# Patient Record
Sex: FEMALE | Race: WHITE
[De-identification: names, ages, dates, MRNs, and addresses within clinical notes are randomized per-mention and may not be internally consistent; named-entity substitution may affect disease eponyms.]

---

## 2019-03-11 ENCOUNTER — HOSPITAL ENCOUNTER (INPATIENT)
Dept: HOSPITAL 94 - ICU 2S | Age: 29
LOS: 6 days | Discharge: LEFT BEFORE DISCHARGE | DRG: 133 | End: 2019-03-17
Attending: INTERNAL MEDICINE | Admitting: INTERNAL MEDICINE
Payer: MEDICAID

## 2019-03-11 VITALS — BODY MASS INDEX: 19.17 KG/M2 | WEIGHT: 115.08 LBS | HEIGHT: 65 IN

## 2019-03-11 DIAGNOSIS — Z91.11: ICD-10-CM

## 2019-03-11 DIAGNOSIS — Z86.711: ICD-10-CM

## 2019-03-11 DIAGNOSIS — R21: ICD-10-CM

## 2019-03-11 DIAGNOSIS — J96.00: Primary | ICD-10-CM

## 2019-03-11 DIAGNOSIS — F32.9: ICD-10-CM

## 2019-03-11 DIAGNOSIS — F41.8: ICD-10-CM

## 2019-03-11 DIAGNOSIS — E83.42: ICD-10-CM

## 2019-03-11 DIAGNOSIS — J18.9: ICD-10-CM

## 2019-03-11 DIAGNOSIS — E87.5: ICD-10-CM

## 2019-03-11 DIAGNOSIS — Z88.8: ICD-10-CM

## 2019-03-11 DIAGNOSIS — F41.9: ICD-10-CM

## 2019-03-11 DIAGNOSIS — I50.9: ICD-10-CM

## 2019-03-11 DIAGNOSIS — I11.0: ICD-10-CM

## 2019-03-11 DIAGNOSIS — Z56.0: ICD-10-CM

## 2019-03-11 DIAGNOSIS — Z53.21: ICD-10-CM

## 2019-03-11 DIAGNOSIS — E87.6: ICD-10-CM

## 2019-03-11 DIAGNOSIS — E10.40: ICD-10-CM

## 2019-03-11 DIAGNOSIS — Z79.01: ICD-10-CM

## 2019-03-11 DIAGNOSIS — F17.200: ICD-10-CM

## 2019-03-11 PROCEDURE — 84132 ASSAY OF SERUM POTASSIUM: CPT

## 2019-03-11 PROCEDURE — 84484 ASSAY OF TROPONIN QUANT: CPT

## 2019-03-11 PROCEDURE — 83735 ASSAY OF MAGNESIUM: CPT

## 2019-03-11 PROCEDURE — 80202 ASSAY OF VANCOMYCIN: CPT

## 2019-03-11 PROCEDURE — 82948 REAGENT STRIP/BLOOD GLUCOSE: CPT

## 2019-03-11 PROCEDURE — 85379 FIBRIN DEGRADATION QUANT: CPT

## 2019-03-11 PROCEDURE — 85018 HEMOGLOBIN: CPT

## 2019-03-11 PROCEDURE — 94640 AIRWAY INHALATION TREATMENT: CPT

## 2019-03-11 PROCEDURE — 82803 BLOOD GASES ANY COMBINATION: CPT

## 2019-03-11 PROCEDURE — 85025 COMPLETE CBC W/AUTO DIFF WBC: CPT

## 2019-03-11 PROCEDURE — 94760 N-INVAS EAR/PLS OXIMETRY 1: CPT

## 2019-03-11 PROCEDURE — 86900 BLOOD TYPING SEROLOGIC ABO: CPT

## 2019-03-11 PROCEDURE — 86901 BLOOD TYPING SEROLOGIC RH(D): CPT

## 2019-03-11 PROCEDURE — 83036 HEMOGLOBIN GLYCOSYLATED A1C: CPT

## 2019-03-11 PROCEDURE — 83605 ASSAY OF LACTIC ACID: CPT

## 2019-03-11 PROCEDURE — 36600 WITHDRAWAL OF ARTERIAL BLOOD: CPT

## 2019-03-11 PROCEDURE — 84100 ASSAY OF PHOSPHORUS: CPT

## 2019-03-11 PROCEDURE — 87040 BLOOD CULTURE FOR BACTERIA: CPT

## 2019-03-11 PROCEDURE — 85610 PROTHROMBIN TIME: CPT

## 2019-03-11 PROCEDURE — 87070 CULTURE OTHR SPECIMN AEROBIC: CPT

## 2019-03-11 PROCEDURE — 93971 EXTREMITY STUDY: CPT

## 2019-03-11 PROCEDURE — 80053 COMPREHEN METABOLIC PANEL: CPT

## 2019-03-11 PROCEDURE — 93005 ELECTROCARDIOGRAM TRACING: CPT

## 2019-03-11 PROCEDURE — 83880 ASSAY OF NATRIURETIC PEPTIDE: CPT

## 2019-03-11 PROCEDURE — 86885 COOMBS TEST INDIRECT QUAL: CPT

## 2019-03-11 PROCEDURE — 85730 THROMBOPLASTIN TIME PARTIAL: CPT

## 2019-03-11 PROCEDURE — 93306 TTE W/DOPPLER COMPLETE: CPT

## 2019-03-11 PROCEDURE — 94660 CPAP INITIATION&MGMT: CPT

## 2019-03-11 PROCEDURE — 36415 COLL VENOUS BLD VENIPUNCTURE: CPT

## 2019-03-11 PROCEDURE — 71045 X-RAY EXAM CHEST 1 VIEW: CPT

## 2019-03-11 PROCEDURE — 84145 PROCALCITONIN (PCT): CPT

## 2019-03-11 SDOH — ECONOMIC STABILITY - INCOME SECURITY: UNEMPLOYMENT, UNSPECIFIED: Z56.0

## 2019-03-12 VITALS — SYSTOLIC BLOOD PRESSURE: 110 MMHG | DIASTOLIC BLOOD PRESSURE: 54 MMHG

## 2019-03-12 VITALS — DIASTOLIC BLOOD PRESSURE: 84 MMHG | SYSTOLIC BLOOD PRESSURE: 130 MMHG

## 2019-03-12 VITALS — DIASTOLIC BLOOD PRESSURE: 115 MMHG | SYSTOLIC BLOOD PRESSURE: 170 MMHG

## 2019-03-12 VITALS — SYSTOLIC BLOOD PRESSURE: 132 MMHG | DIASTOLIC BLOOD PRESSURE: 80 MMHG

## 2019-03-12 VITALS — SYSTOLIC BLOOD PRESSURE: 110 MMHG | DIASTOLIC BLOOD PRESSURE: 57 MMHG

## 2019-03-12 VITALS — SYSTOLIC BLOOD PRESSURE: 126 MMHG | DIASTOLIC BLOOD PRESSURE: 67 MMHG

## 2019-03-12 VITALS — DIASTOLIC BLOOD PRESSURE: 68 MMHG | SYSTOLIC BLOOD PRESSURE: 122 MMHG

## 2019-03-12 VITALS — DIASTOLIC BLOOD PRESSURE: 69 MMHG | SYSTOLIC BLOOD PRESSURE: 123 MMHG

## 2019-03-12 VITALS — SYSTOLIC BLOOD PRESSURE: 116 MMHG | DIASTOLIC BLOOD PRESSURE: 62 MMHG

## 2019-03-12 VITALS — SYSTOLIC BLOOD PRESSURE: 136 MMHG | DIASTOLIC BLOOD PRESSURE: 86 MMHG

## 2019-03-12 VITALS — DIASTOLIC BLOOD PRESSURE: 105 MMHG | SYSTOLIC BLOOD PRESSURE: 145 MMHG

## 2019-03-12 VITALS — DIASTOLIC BLOOD PRESSURE: 61 MMHG | SYSTOLIC BLOOD PRESSURE: 102 MMHG

## 2019-03-12 VITALS — DIASTOLIC BLOOD PRESSURE: 90 MMHG | SYSTOLIC BLOOD PRESSURE: 141 MMHG

## 2019-03-12 VITALS — SYSTOLIC BLOOD PRESSURE: 111 MMHG | DIASTOLIC BLOOD PRESSURE: 68 MMHG

## 2019-03-12 VITALS — DIASTOLIC BLOOD PRESSURE: 89 MMHG | SYSTOLIC BLOOD PRESSURE: 137 MMHG

## 2019-03-12 VITALS — SYSTOLIC BLOOD PRESSURE: 129 MMHG | DIASTOLIC BLOOD PRESSURE: 79 MMHG

## 2019-03-12 VITALS — DIASTOLIC BLOOD PRESSURE: 110 MMHG | SYSTOLIC BLOOD PRESSURE: 170 MMHG

## 2019-03-12 VITALS — DIASTOLIC BLOOD PRESSURE: 74 MMHG | SYSTOLIC BLOOD PRESSURE: 122 MMHG

## 2019-03-12 VITALS — DIASTOLIC BLOOD PRESSURE: 52 MMHG | SYSTOLIC BLOOD PRESSURE: 101 MMHG

## 2019-03-12 LAB
ALBUMIN SERPL BCP-MCNC: 1.8 G/DL (ref 3.4–5)
ALBUMIN/GLOB SERPL: 0.4 {RATIO} (ref 1.1–1.5)
ALP SERPL-CCNC: 282 IU/L (ref 46–116)
ALT SERPL W P-5'-P-CCNC: 18 U/L (ref 12–78)
ANION GAP SERPL CALCULATED.3IONS-SCNC: 12 MMOL/L (ref 8–16)
APTT PPP: 39 SECONDS (ref 22–32)
AST SERPL W P-5'-P-CCNC: 36 U/L (ref 10–37)
BASE EXCESS BLDA CALC-SCNC: -6.2 MMOL/L (ref -2–3)
BASOPHILS # BLD AUTO: 0.1 X10'3 (ref 0–0.2)
BASOPHILS NFR BLD AUTO: 0.4 % (ref 0–1)
BILIRUB SERPL-MCNC: 0.4 MG/DL (ref 0.1–1)
BODY TEMPERATURE: 37
BUN SERPL-MCNC: 16 MG/DL (ref 7–18)
BUN/CREAT SERPL: 13.1 (ref 6.6–38)
CALCIUM SERPL-MCNC: 8.4 MG/DL (ref 8.5–10.1)
CHLORIDE SERPL-SCNC: 107 MMOL/L (ref 99–107)
CO2 SERPL-SCNC: 20.4 MMOL/L (ref 24–32)
COHGB MFR BLDA: 0.3 % (ref 0.5–1.5)
CREAT SERPL-MCNC: 1.22 MG/DL (ref 0.4–0.9)
D DIMER PPP FEU-MCNC: 5.73 MG/L FEU (ref 0–0.5)
EOSINOPHIL # BLD AUTO: 0.5 X10'3 (ref 0–0.9)
EOSINOPHIL NFR BLD AUTO: 2 % (ref 0–6)
EOSINOPHIL NFR BLD MANUAL: 1 % (ref 0–6)
ERYTHROCYTE [DISTWIDTH] IN BLOOD BY AUTOMATED COUNT: 15.9 % (ref 11.5–14.5)
GFR SERPL CREATININE-BSD FRML MDRD: 53 ML/MIN
GLUCOSE SERPL-MCNC: 358 MG/DL (ref 70–104)
HBA1C MFR BLD: 9.4 % (ref 4.5–6.2)
HCO3 BLDA-SCNC: 19.8 MMOL/L (ref 22–26)
HCT VFR BLD AUTO: 33.9 % (ref 35–45)
HGB BLD-MCNC: 10.7 G/DL (ref 12–16)
HGB BLDA-MCNC: 11.3 G/DL (ref 12–16)
INR PPP: 1.2 INR
LYMPHOCYTES # BLD AUTO: 1.4 X10'3 (ref 1.1–4.8)
LYMPHOCYTES NFR BLD AUTO: 5.8 % (ref 21–51)
LYMPHOCYTES NFR BLD MANUAL: 7 % (ref 21–51)
MAGNESIUM SERPL-MCNC: 1.8 MG/DL (ref 1.5–2.4)
MCH RBC QN AUTO: 29.7 PG (ref 27–31)
MCHC RBC AUTO-ENTMCNC: 31.6 G/DL (ref 33–36.5)
MCV RBC AUTO: 94 FL (ref 78–98)
METHGB MFR BLDA: 0.2 % (ref 0.3–1.12)
MONOCYTES # BLD AUTO: 1.3 X10'3 (ref 0–0.9)
MONOCYTES NFR BLD AUTO: 5.5 % (ref 2–12)
MONOCYTES NFR BLD MANUAL: 2 % (ref 2–12)
NEUTROPHILS # BLD AUTO: 20.1 X10'3 (ref 1.8–7.7)
NEUTROPHILS NFR BLD AUTO: 86.3 % (ref 42–75)
NEUTS BAND # BLD MANUAL: 24 % (ref 0–10)
NEUTS SEG NFR BLD MANUAL: 66 % (ref 42–75)
O2/TOTAL GAS SETTING VFR VENT: 75 MMHG/%
OXYHGB MFR BLDA: 99 % (ref 94–100)
PCO2 TEMP ADJ BLDA: 41.3 MMHG (ref 32–45)
PH TEMP ADJ BLDA: 7.3 [PH] (ref 7.35–7.45)
PLATELET # BLD AUTO: 286 X10'3 (ref 140–440)
PLATELET BLD QL SMEAR: NORMAL
PMV BLD AUTO: 9.8 FL (ref 7.4–10.4)
PO2 TEMP ADJ BLD: 258.8 MMHG (ref 83–108)
POTASSIUM SERPL-SCNC: 4.2 MMOL/L (ref 3.5–5.1)
PROT SERPL-MCNC: 6.3 G/DL (ref 6.4–8.2)
PROTHROMBIN TIME: 11.9 SECONDS (ref 9–12)
RBC # BLD AUTO: 3.6 X10'6 (ref 4.2–5.6)
RBC MORPH BLD: NORMAL
RESP RATE 1H: 12 B/MIN
RESP RATE RESTING: 28 B/MIN
SAO2 % BLDA: 99.5 % (ref 95–98)
SODIUM SERPL-SCNC: 139 MMOL/L (ref 135–145)
TOTAL CELLS COUNTED FLD: 100
TOXIC GRANULES BLD QL SMEAR: (no result)
TROPONIN I SERPL-MCNC: < 0.04 NG/ML (ref 0–0.05)
WBC # BLD AUTO: 23.2 X10'3 (ref 4.5–11)

## 2019-03-12 PROCEDURE — 5A09357 ASSISTANCE WITH RESPIRATORY VENTILATION, LESS THAN 24 CONSECUTIVE HOURS, CONTINUOUS POSITIVE AIRWAY PRESSURE: ICD-10-PCS | Performed by: HOSPITALIST

## 2019-03-12 RX ADMIN — ONDANSETRON PRN MG: 2 INJECTION, SOLUTION INTRAMUSCULAR; INTRAVENOUS at 07:17

## 2019-03-12 RX ADMIN — SODIUM CHLORIDE SCH MLS/HR: 9 INJECTION INTRAMUSCULAR; INTRAVENOUS; SUBCUTANEOUS at 07:00

## 2019-03-12 RX ADMIN — IPRATROPIUM BROMIDE AND ALBUTEROL SULFATE SCH ML: .5; 3 SOLUTION RESPIRATORY (INHALATION) at 12:02

## 2019-03-12 RX ADMIN — ONDANSETRON PRN MG: 2 INJECTION, SOLUTION INTRAMUSCULAR; INTRAVENOUS at 21:24

## 2019-03-12 RX ADMIN — IPRATROPIUM BROMIDE AND ALBUTEROL SULFATE SCH ML: .5; 3 SOLUTION RESPIRATORY (INHALATION) at 08:04

## 2019-03-12 RX ADMIN — DOCUSATE SODIUM SCH MG: 100 CAPSULE, LIQUID FILLED ORAL at 20:00

## 2019-03-12 RX ADMIN — IPRATROPIUM BROMIDE AND ALBUTEROL SULFATE SCH ML: .5; 3 SOLUTION RESPIRATORY (INHALATION) at 15:00

## 2019-03-12 RX ADMIN — IPRATROPIUM BROMIDE AND ALBUTEROL SULFATE SCH ML: .5; 3 SOLUTION RESPIRATORY (INHALATION) at 19:41

## 2019-03-12 RX ADMIN — TAZOBACTAM SODIUM AND PIPERACILLIN SODIUM SCH MLS/HR: 375; 3 INJECTION, SOLUTION INTRAVENOUS at 22:24

## 2019-03-12 RX ADMIN — TAZOBACTAM SODIUM AND PIPERACILLIN SODIUM SCH MLS/HR: 375; 3 INJECTION, SOLUTION INTRAVENOUS at 07:30

## 2019-03-12 RX ADMIN — HYDROMORPHONE HYDROCHLORIDE PRN MG: 1 INJECTION, SOLUTION INTRAMUSCULAR; INTRAVENOUS; SUBCUTANEOUS at 06:10

## 2019-03-12 RX ADMIN — HYDROMORPHONE HYDROCHLORIDE PRN MG: 1 INJECTION, SOLUTION INTRAMUSCULAR; INTRAVENOUS; SUBCUTANEOUS at 10:31

## 2019-03-12 RX ADMIN — INSULIN LISPRO SCH UNITS: 100 INJECTION, SOLUTION INTRAVENOUS; SUBCUTANEOUS at 21:01

## 2019-03-12 RX ADMIN — TAZOBACTAM SODIUM AND PIPERACILLIN SODIUM SCH MLS/HR: 375; 3 INJECTION, SOLUTION INTRAVENOUS at 14:04

## 2019-03-12 RX ADMIN — INSULIN GLARGINE SCH UNIT: 100 INJECTION, SOLUTION SUBCUTANEOUS at 21:02

## 2019-03-12 RX ADMIN — INSULIN LISPRO SCH UNITS: 100 INJECTION, SOLUTION INTRAVENOUS; SUBCUTANEOUS at 10:31

## 2019-03-12 RX ADMIN — Medication SCH MG: at 10:02

## 2019-03-12 RX ADMIN — DOCUSATE SODIUM SCH MG: 100 CAPSULE, LIQUID FILLED ORAL at 10:02

## 2019-03-12 RX ADMIN — INSULIN LISPRO SCH UNITS: 100 INJECTION, SOLUTION INTRAVENOUS; SUBCUTANEOUS at 07:13

## 2019-03-12 RX ADMIN — VANCOMYCIN HYDROCHLORIDE SCH MLS/HR: 750 INJECTION, POWDER, LYOPHILIZED, FOR SOLUTION INTRAVENOUS at 20:47

## 2019-03-12 RX ADMIN — HYDROMORPHONE HYDROCHLORIDE PRN MG: 1 INJECTION, SOLUTION INTRAMUSCULAR; INTRAVENOUS; SUBCUTANEOUS at 14:38

## 2019-03-12 RX ADMIN — HYDROMORPHONE HYDROCHLORIDE PRN MG: 1 INJECTION, SOLUTION INTRAMUSCULAR; INTRAVENOUS; SUBCUTANEOUS at 18:46

## 2019-03-12 RX ADMIN — IPRATROPIUM BROMIDE AND ALBUTEROL SULFATE SCH ML: .5; 3 SOLUTION RESPIRATORY (INHALATION) at 22:51

## 2019-03-12 RX ADMIN — HYDROMORPHONE HYDROCHLORIDE PRN MG: 1 INJECTION, SOLUTION INTRAMUSCULAR; INTRAVENOUS; SUBCUTANEOUS at 22:24

## 2019-03-12 RX ADMIN — VANCOMYCIN HYDROCHLORIDE SCH MLS/HR: 750 INJECTION, POWDER, LYOPHILIZED, FOR SOLUTION INTRAVENOUS at 10:04

## 2019-03-12 RX ADMIN — INSULIN LISPRO SCH UNITS: 100 INJECTION, SOLUTION INTRAVENOUS; SUBCUTANEOUS at 14:08

## 2019-03-12 NOTE — NUR
Patient in room CICU 2014. I have received report from MORELIA Carmona and had the opportunity to 
ask questions and assume patient care. Patient is resting comfortably in hospital bed, she 
has asked for broth or food. I advised her I will check with her MD as she is NPO at this 
time.

## 2019-03-12 NOTE — NUR
Patient belongings: Patient had purse with her at the bedside. Wallet containing $105 cash, 
change, State issued ID and SS card., Bank card. Admitting called to place wallet in safe. 

Medications sent to pharmacy.

Purse also contained one up opened pack of cigarettes, lighter, glucometer, and other items 
( chapstick, mints, etc)

## 2019-03-12 NOTE — NUR
Talked with ADEBAYO Collins regarding patients high /105. Patient has been fighting BP cuff 
all night, she either bends arm or pulls at cuff when BP is being taken. Per January, 
continue to monitor she noted that her lisinopril was d/c and I could give her a dose if BP 
continue to be high.

## 2019-03-12 NOTE — NUR
Patient continues to ask for food, I cld January,PA and she gave order for clear liquid diet 
as tolerated.

## 2019-03-12 NOTE — NUR
DM consult, A1C is 9.4 and history of type 1 diabetes; patient needs 

written DM education handout with 

verbal review and referral to outpatient DM education class when she is 

able, currently on BiPap and will become very short of breath, is not 

appropriate for bedside education. 

Patient admitted with acute respiratory distress syndrome and DKA, DKA is 

resolved per MD note. 





Recommend: 

1. When medically indicated advance diet to carb controlled

2. Continue bowel care

3. Weight per rx

4. Education: provide written and verbal DM education when pt is able

-------------------------------------------------------------------------------

Addendum: 03/12/19 at 1555 by Sarah [address] RD

-------------------------------------------------------------------------------

Amended: Links added.

## 2019-03-13 VITALS — DIASTOLIC BLOOD PRESSURE: 75 MMHG | SYSTOLIC BLOOD PRESSURE: 130 MMHG

## 2019-03-13 VITALS — DIASTOLIC BLOOD PRESSURE: 92 MMHG | SYSTOLIC BLOOD PRESSURE: 150 MMHG

## 2019-03-13 VITALS — DIASTOLIC BLOOD PRESSURE: 73 MMHG | SYSTOLIC BLOOD PRESSURE: 130 MMHG

## 2019-03-13 VITALS — DIASTOLIC BLOOD PRESSURE: 95 MMHG | SYSTOLIC BLOOD PRESSURE: 146 MMHG

## 2019-03-13 VITALS — SYSTOLIC BLOOD PRESSURE: 173 MMHG | DIASTOLIC BLOOD PRESSURE: 112 MMHG

## 2019-03-13 VITALS — SYSTOLIC BLOOD PRESSURE: 136 MMHG | DIASTOLIC BLOOD PRESSURE: 82 MMHG

## 2019-03-13 VITALS — SYSTOLIC BLOOD PRESSURE: 136 MMHG | DIASTOLIC BLOOD PRESSURE: 73 MMHG

## 2019-03-13 VITALS — DIASTOLIC BLOOD PRESSURE: 96 MMHG | SYSTOLIC BLOOD PRESSURE: 147 MMHG

## 2019-03-13 VITALS — SYSTOLIC BLOOD PRESSURE: 133 MMHG | DIASTOLIC BLOOD PRESSURE: 93 MMHG

## 2019-03-13 VITALS — SYSTOLIC BLOOD PRESSURE: 120 MMHG | DIASTOLIC BLOOD PRESSURE: 79 MMHG

## 2019-03-13 VITALS — SYSTOLIC BLOOD PRESSURE: 156 MMHG | DIASTOLIC BLOOD PRESSURE: 87 MMHG

## 2019-03-13 VITALS — DIASTOLIC BLOOD PRESSURE: 84 MMHG | SYSTOLIC BLOOD PRESSURE: 132 MMHG

## 2019-03-13 VITALS — SYSTOLIC BLOOD PRESSURE: 130 MMHG | DIASTOLIC BLOOD PRESSURE: 69 MMHG

## 2019-03-13 VITALS — SYSTOLIC BLOOD PRESSURE: 133 MMHG | DIASTOLIC BLOOD PRESSURE: 73 MMHG

## 2019-03-13 VITALS — DIASTOLIC BLOOD PRESSURE: 103 MMHG | SYSTOLIC BLOOD PRESSURE: 163 MMHG

## 2019-03-13 VITALS — DIASTOLIC BLOOD PRESSURE: 68 MMHG | SYSTOLIC BLOOD PRESSURE: 163 MMHG

## 2019-03-13 VITALS — DIASTOLIC BLOOD PRESSURE: 67 MMHG | SYSTOLIC BLOOD PRESSURE: 134 MMHG

## 2019-03-13 VITALS — SYSTOLIC BLOOD PRESSURE: 136 MMHG | DIASTOLIC BLOOD PRESSURE: 77 MMHG

## 2019-03-13 VITALS — SYSTOLIC BLOOD PRESSURE: 166 MMHG | DIASTOLIC BLOOD PRESSURE: 101 MMHG

## 2019-03-13 VITALS — DIASTOLIC BLOOD PRESSURE: 106 MMHG | SYSTOLIC BLOOD PRESSURE: 148 MMHG

## 2019-03-13 VITALS — DIASTOLIC BLOOD PRESSURE: 110 MMHG | SYSTOLIC BLOOD PRESSURE: 175 MMHG

## 2019-03-13 VITALS — SYSTOLIC BLOOD PRESSURE: 150 MMHG | DIASTOLIC BLOOD PRESSURE: 89 MMHG

## 2019-03-13 VITALS — DIASTOLIC BLOOD PRESSURE: 74 MMHG | SYSTOLIC BLOOD PRESSURE: 121 MMHG

## 2019-03-13 VITALS — DIASTOLIC BLOOD PRESSURE: 91 MMHG | SYSTOLIC BLOOD PRESSURE: 143 MMHG

## 2019-03-13 LAB
ALBUMIN SERPL BCP-MCNC: 1.9 G/DL (ref 3.4–5)
ALBUMIN/GLOB SERPL: 0.4 {RATIO} (ref 1.1–1.5)
ALP SERPL-CCNC: 261 IU/L (ref 46–116)
ALT SERPL W P-5'-P-CCNC: 17 U/L (ref 12–78)
ANION GAP SERPL CALCULATED.3IONS-SCNC: 8 MMOL/L (ref 8–16)
APTT PPP: 33 SECONDS (ref 22–32)
AST SERPL W P-5'-P-CCNC: 17 U/L (ref 10–37)
BASE EXCESS BLDA CALC-SCNC: 7.3 MMOL/L (ref -2–3)
BASOPHILS # BLD AUTO: 0 X10'3 (ref 0–0.2)
BASOPHILS NFR BLD AUTO: 0.3 % (ref 0–1)
BILIRUB SERPL-MCNC: 0.3 MG/DL (ref 0.1–1)
BODY TEMPERATURE: 37
BUN SERPL-MCNC: 20 MG/DL (ref 7–18)
BUN/CREAT SERPL: 17.9 (ref 6.6–38)
CALCIUM SERPL-MCNC: 8.4 MG/DL (ref 8.5–10.1)
CHLORIDE SERPL-SCNC: 103 MMOL/L (ref 99–107)
CO2 SERPL-SCNC: 28.1 MMOL/L (ref 24–32)
COHGB MFR BLDA: 0.2 % (ref 0.5–1.5)
CREAT SERPL-MCNC: 1.12 MG/DL (ref 0.4–0.9)
EOSINOPHIL # BLD AUTO: 0 X10'3 (ref 0–0.9)
EOSINOPHIL NFR BLD AUTO: 0.2 % (ref 0–6)
ERYTHROCYTE [DISTWIDTH] IN BLOOD BY AUTOMATED COUNT: 16 % (ref 11.5–14.5)
GFR SERPL CREATININE-BSD FRML MDRD: 59 ML/MIN
GLUCOSE SERPL-MCNC: 385 MG/DL (ref 70–104)
HCO3 BLDA-SCNC: 32.6 MMOL/L (ref 22–26)
HCT VFR BLD AUTO: 35.1 % (ref 35–45)
HGB BLD-MCNC: 11.4 G/DL (ref 12–16)
HGB BLDA-MCNC: 12.1 G/DL (ref 12–16)
INHALED O2 FLOW RATE: 3 L/MIN
INR PPP: 1.2 INR
LYMPHOCYTES # BLD AUTO: 0.9 X10'3 (ref 1.1–4.8)
LYMPHOCYTES NFR BLD AUTO: 7.1 % (ref 21–51)
MAGNESIUM SERPL-MCNC: 1.2 MG/DL (ref 1.5–2.4)
MCH RBC QN AUTO: 30.1 PG (ref 27–31)
MCHC RBC AUTO-ENTMCNC: 32.4 G/DL (ref 33–36.5)
MCV RBC AUTO: 93.2 FL (ref 78–98)
METHGB MFR BLDA: 0.3 % (ref 0.3–1.12)
MONOCYTES # BLD AUTO: 0.5 X10'3 (ref 0–0.9)
MONOCYTES NFR BLD AUTO: 4 % (ref 2–12)
NEUTROPHILS # BLD AUTO: 10.8 X10'3 (ref 1.8–7.7)
NEUTROPHILS NFR BLD AUTO: 88.4 % (ref 42–75)
O2/TOTAL GAS SETTING VFR VENT: 33 MMHG/%
OXYHGB MFR BLDA: 95.3 % (ref 94–100)
PCO2 TEMP ADJ BLDA: 49 MMHG (ref 32–45)
PH TEMP ADJ BLDA: 7.44 [PH] (ref 7.35–7.45)
PHOSPHATE SERPL-MCNC: 3.4 MG/DL (ref 2.3–4.5)
PLATELET # BLD AUTO: 331 X10'3 (ref 140–440)
PMV BLD AUTO: 9.4 FL (ref 7.4–10.4)
PO2 TEMP ADJ BLD: 76.5 MMHG (ref 83–108)
POTASSIUM SERPL-SCNC: 4 MMOL/L (ref 3.5–5.1)
PROT SERPL-MCNC: 6.5 G/DL (ref 6.4–8.2)
PROTHROMBIN TIME: 11.8 SECONDS (ref 9–12)
RBC # BLD AUTO: 3.77 X10'6 (ref 4.2–5.6)
SAO2 % BLDA: 95.8 % (ref 95–98)
SODIUM SERPL-SCNC: 139 MMOL/L (ref 135–145)
WBC # BLD AUTO: 12.2 X10'3 (ref 4.5–11)

## 2019-03-13 RX ADMIN — HYDROMORPHONE HYDROCHLORIDE PRN MG: 1 INJECTION, SOLUTION INTRAMUSCULAR; INTRAVENOUS; SUBCUTANEOUS at 08:11

## 2019-03-13 RX ADMIN — IPRATROPIUM BROMIDE AND ALBUTEROL SULFATE SCH ML: .5; 3 SOLUTION RESPIRATORY (INHALATION) at 19:33

## 2019-03-13 RX ADMIN — DOCUSATE SODIUM SCH MG: 100 CAPSULE, LIQUID FILLED ORAL at 19:14

## 2019-03-13 RX ADMIN — INSULIN LISPRO SCH UNITS: 100 INJECTION, SOLUTION INTRAVENOUS; SUBCUTANEOUS at 14:01

## 2019-03-13 RX ADMIN — INSULIN LISPRO SCH UNITS: 100 INJECTION, SOLUTION INTRAVENOUS; SUBCUTANEOUS at 08:45

## 2019-03-13 RX ADMIN — HYDROMORPHONE HYDROCHLORIDE PRN MG: 1 INJECTION, SOLUTION INTRAMUSCULAR; INTRAVENOUS; SUBCUTANEOUS at 17:02

## 2019-03-13 RX ADMIN — IPRATROPIUM BROMIDE AND ALBUTEROL SULFATE SCH ML: .5; 3 SOLUTION RESPIRATORY (INHALATION) at 02:57

## 2019-03-13 RX ADMIN — Medication PRN MG: at 20:54

## 2019-03-13 RX ADMIN — IPRATROPIUM BROMIDE AND ALBUTEROL SULFATE SCH ML: .5; 3 SOLUTION RESPIRATORY (INHALATION) at 11:43

## 2019-03-13 RX ADMIN — TAZOBACTAM SODIUM AND PIPERACILLIN SODIUM SCH MLS/HR: 375; 3 INJECTION, SOLUTION INTRAVENOUS at 05:25

## 2019-03-13 RX ADMIN — HYDROMORPHONE HYDROCHLORIDE PRN MG: 1 INJECTION, SOLUTION INTRAMUSCULAR; INTRAVENOUS; SUBCUTANEOUS at 02:25

## 2019-03-13 RX ADMIN — ONDANSETRON PRN MG: 2 INJECTION, SOLUTION INTRAMUSCULAR; INTRAVENOUS at 04:12

## 2019-03-13 RX ADMIN — Medication PRN MG: at 11:38

## 2019-03-13 RX ADMIN — HYDROMORPHONE HYDROCHLORIDE PRN MG: 1 INJECTION, SOLUTION INTRAMUSCULAR; INTRAVENOUS; SUBCUTANEOUS at 12:38

## 2019-03-13 RX ADMIN — Medication SCH MG: at 08:12

## 2019-03-13 RX ADMIN — INSULIN LISPRO SCH UNITS: 100 INJECTION, SOLUTION INTRAVENOUS; SUBCUTANEOUS at 21:03

## 2019-03-13 RX ADMIN — IPRATROPIUM BROMIDE AND ALBUTEROL SULFATE SCH ML: .5; 3 SOLUTION RESPIRATORY (INHALATION) at 15:44

## 2019-03-13 RX ADMIN — IPRATROPIUM BROMIDE AND ALBUTEROL SULFATE SCH ML: .5; 3 SOLUTION RESPIRATORY (INHALATION) at 08:23

## 2019-03-13 RX ADMIN — TAZOBACTAM SODIUM AND PIPERACILLIN SODIUM SCH MLS/HR: 375; 3 INJECTION, SOLUTION INTRAVENOUS at 23:06

## 2019-03-13 RX ADMIN — VANCOMYCIN HYDROCHLORIDE SCH MLS/HR: 750 INJECTION, POWDER, LYOPHILIZED, FOR SOLUTION INTRAVENOUS at 08:10

## 2019-03-13 RX ADMIN — INSULIN LISPRO SCH UNITS: 100 INJECTION, SOLUTION INTRAVENOUS; SUBCUTANEOUS at 19:43

## 2019-03-13 RX ADMIN — DOCUSATE SODIUM SCH MG: 100 CAPSULE, LIQUID FILLED ORAL at 08:00

## 2019-03-13 RX ADMIN — VANCOMYCIN HYDROCHLORIDE SCH MLS/HR: 750 INJECTION, POWDER, LYOPHILIZED, FOR SOLUTION INTRAVENOUS at 20:58

## 2019-03-13 RX ADMIN — TAZOBACTAM SODIUM AND PIPERACILLIN SODIUM SCH MLS/HR: 375; 3 INJECTION, SOLUTION INTRAVENOUS at 14:00

## 2019-03-13 RX ADMIN — INSULIN GLARGINE SCH UNIT: 100 INJECTION, SOLUTION SUBCUTANEOUS at 21:02

## 2019-03-13 RX ADMIN — SODIUM CHLORIDE SCH MLS/HR: 9 INJECTION INTRAMUSCULAR; INTRAVENOUS; SUBCUTANEOUS at 23:03

## 2019-03-13 RX ADMIN — HYDROMORPHONE HYDROCHLORIDE PRN MG: 1 INJECTION, SOLUTION INTRAMUSCULAR; INTRAVENOUS; SUBCUTANEOUS at 21:03

## 2019-03-13 RX ADMIN — FONDAPARINUX SODIUM SCH MG: 7.5 INJECTION, SOLUTION SUBCUTANEOUS at 09:15

## 2019-03-13 RX ADMIN — IPRATROPIUM BROMIDE AND ALBUTEROL SULFATE SCH ML: .5; 3 SOLUTION RESPIRATORY (INHALATION) at 23:01

## 2019-03-13 NOTE — NUR
Pt had one episode of desaturation and need for bipap. Pt's sats read 30-40% with good pleth 
and pulse matching ekg rate. New pulse ox obtained and placed on 2 different fingers and 
both ears to verify reading. Mobile pulse ox also used. All had same reading of sats in 
30s-40s. Pt awake, talking, playing on her phone and in no signs of respiratory distress. 
Placed pt on bipap at 100% FiO2 and pt's sats jumped back up to 100%. Left pt on bipap for 
10 mins. Pt talking and asking for bipap to be removed the entire time. Stat CXR and ABG 
obtained. Dr. Solorio notified and gave verbal orders to keep pt in ICU overnight. Will continue 
to monitor

## 2019-03-13 NOTE — NUR
Problems reprioritized. Patient report given, questions answered & plan of care reviewed 
with MORELIA Muller.

## 2019-03-13 NOTE — NUR
Patient in room CICU 2014. I have received report from MORELIA Gutierrez and had the opportunity to 
ask questions and assume patient care.

## 2019-03-13 NOTE — NUR
Pt states that she feels like her "blood sugar is low". , pt states that this is low 
for her. Snack given.

## 2019-03-13 NOTE — NUR
Patient in room CICU 2014. I have received report from MORELIA Muller, and had the opportunity 
to ask questions and assume patient care.

## 2019-03-13 NOTE — NUR
DM consult, A1C is 9.4 and history of type 1 diabetes; attempted education today RD visited 
pt at bedside and was using BiPaP at that time. Carb controlled lunch was not eaten yet. Per 
nurse BiPaP had been on for only 15 minutes. Patient was emotional and wanting to eat, 
attempted to explain A1C and patient stated she already knows what it meals. Written DM 
education was left at bedside. She has no chewing or swallowing difficulty when BiPaP is 
off. 


-------------------------------------------------------------------------------

Addendum: 03/13/19 at 1429 by [address] RD

-------------------------------------------------------------------------------

Amended: Links added.

## 2019-03-13 NOTE — NUR
Problems reprioritized. Patient report given, questions answered & plan of care reviewed 
with MORELIA Grey.

## 2019-03-14 VITALS — DIASTOLIC BLOOD PRESSURE: 69 MMHG | SYSTOLIC BLOOD PRESSURE: 120 MMHG

## 2019-03-14 VITALS — SYSTOLIC BLOOD PRESSURE: 136 MMHG | DIASTOLIC BLOOD PRESSURE: 89 MMHG

## 2019-03-14 VITALS — SYSTOLIC BLOOD PRESSURE: 123 MMHG | DIASTOLIC BLOOD PRESSURE: 70 MMHG

## 2019-03-14 VITALS — SYSTOLIC BLOOD PRESSURE: 99 MMHG | DIASTOLIC BLOOD PRESSURE: 56 MMHG

## 2019-03-14 VITALS — DIASTOLIC BLOOD PRESSURE: 83 MMHG | SYSTOLIC BLOOD PRESSURE: 124 MMHG

## 2019-03-14 VITALS — DIASTOLIC BLOOD PRESSURE: 79 MMHG | SYSTOLIC BLOOD PRESSURE: 118 MMHG

## 2019-03-14 VITALS — DIASTOLIC BLOOD PRESSURE: 53 MMHG | SYSTOLIC BLOOD PRESSURE: 92 MMHG

## 2019-03-14 VITALS — SYSTOLIC BLOOD PRESSURE: 138 MMHG | DIASTOLIC BLOOD PRESSURE: 78 MMHG

## 2019-03-14 VITALS — SYSTOLIC BLOOD PRESSURE: 97 MMHG | DIASTOLIC BLOOD PRESSURE: 64 MMHG

## 2019-03-14 VITALS — SYSTOLIC BLOOD PRESSURE: 129 MMHG | DIASTOLIC BLOOD PRESSURE: 80 MMHG

## 2019-03-14 VITALS — DIASTOLIC BLOOD PRESSURE: 89 MMHG | SYSTOLIC BLOOD PRESSURE: 139 MMHG

## 2019-03-14 VITALS — DIASTOLIC BLOOD PRESSURE: 86 MMHG | SYSTOLIC BLOOD PRESSURE: 135 MMHG

## 2019-03-14 VITALS — SYSTOLIC BLOOD PRESSURE: 110 MMHG | DIASTOLIC BLOOD PRESSURE: 76 MMHG

## 2019-03-14 VITALS — DIASTOLIC BLOOD PRESSURE: 86 MMHG | SYSTOLIC BLOOD PRESSURE: 141 MMHG

## 2019-03-14 LAB
ALBUMIN SERPL BCP-MCNC: 2.1 G/DL (ref 3.4–5)
ALBUMIN SERPL BCP-MCNC: 2.2 G/DL (ref 3.4–5)
ALBUMIN/GLOB SERPL: 0.5 {RATIO} (ref 1.1–1.5)
ALBUMIN/GLOB SERPL: 0.5 {RATIO} (ref 1.1–1.5)
ALP SERPL-CCNC: 226 IU/L (ref 46–116)
ALP SERPL-CCNC: 229 IU/L (ref 46–116)
ALT SERPL W P-5'-P-CCNC: 14 U/L (ref 12–78)
ALT SERPL W P-5'-P-CCNC: 18 U/L (ref 12–78)
ANION GAP SERPL CALCULATED.3IONS-SCNC: 2 MMOL/L (ref 8–16)
ANION GAP SERPL CALCULATED.3IONS-SCNC: 7 MMOL/L (ref 8–16)
APTT PPP: 33 SECONDS (ref 22–32)
AST SERPL W P-5'-P-CCNC: 10 U/L (ref 10–37)
AST SERPL W P-5'-P-CCNC: 11 U/L (ref 10–37)
BASOPHILS # BLD AUTO: 0.1 X10'3 (ref 0–0.2)
BASOPHILS NFR BLD AUTO: 0.4 % (ref 0–1)
BILIRUB SERPL-MCNC: 0.3 MG/DL (ref 0.1–1)
BILIRUB SERPL-MCNC: 0.3 MG/DL (ref 0.1–1)
BUN SERPL-MCNC: 18 MG/DL (ref 7–18)
BUN SERPL-MCNC: 20 MG/DL (ref 7–18)
BUN/CREAT SERPL: 16.9 (ref 6.6–38)
BUN/CREAT SERPL: 17 (ref 6.6–38)
CALCIUM SERPL-MCNC: 7.9 MG/DL (ref 8.5–10.1)
CALCIUM SERPL-MCNC: 8.1 MG/DL (ref 8.5–10.1)
CHLORIDE SERPL-SCNC: 100 MMOL/L (ref 99–107)
CHLORIDE SERPL-SCNC: 101 MMOL/L (ref 99–107)
CO2 SERPL-SCNC: 35.9 MMOL/L (ref 24–32)
CO2 SERPL-SCNC: 38.4 MMOL/L (ref 24–32)
CREAT SERPL-MCNC: 1.06 MG/DL (ref 0.4–0.9)
CREAT SERPL-MCNC: 1.18 MG/DL (ref 0.4–0.9)
EOSINOPHIL # BLD AUTO: 0.5 X10'3 (ref 0–0.9)
EOSINOPHIL NFR BLD AUTO: 3.4 % (ref 0–6)
ERYTHROCYTE [DISTWIDTH] IN BLOOD BY AUTOMATED COUNT: 15.5 % (ref 11.5–14.5)
GFR SERPL CREATININE-BSD FRML MDRD: 55 ML/MIN
GFR SERPL CREATININE-BSD FRML MDRD: 63 ML/MIN
GLUCOSE SERPL-MCNC: 140 MG/DL (ref 70–104)
GLUCOSE SERPL-MCNC: 72 MG/DL (ref 70–104)
HCT VFR BLD AUTO: 33.9 % (ref 35–45)
HGB BLD-MCNC: 11.2 G/DL (ref 12–16)
INR PPP: 1.2 INR
LYMPHOCYTES # BLD AUTO: 2.3 X10'3 (ref 1.1–4.8)
LYMPHOCYTES NFR BLD AUTO: 15.3 % (ref 21–51)
MAGNESIUM SERPL-MCNC: 1 MG/DL (ref 1.5–2.4)
MCH RBC QN AUTO: 30.5 PG (ref 27–31)
MCHC RBC AUTO-ENTMCNC: 33.1 G/DL (ref 33–36.5)
MCV RBC AUTO: 92.3 FL (ref 78–98)
MONOCYTES # BLD AUTO: 1.3 X10'3 (ref 0–0.9)
MONOCYTES NFR BLD AUTO: 8.7 % (ref 2–12)
NEUTROPHILS # BLD AUTO: 10.7 X10'3 (ref 1.8–7.7)
NEUTROPHILS NFR BLD AUTO: 72.2 % (ref 42–75)
PHOSPHATE SERPL-MCNC: 2.2 MG/DL (ref 2.3–4.5)
PLATELET # BLD AUTO: 385 X10'3 (ref 140–440)
PMV BLD AUTO: 8.6 FL (ref 7.4–10.4)
POTASSIUM SERPL-SCNC: 2.6 MMOL/L (ref 3.5–5.1)
POTASSIUM SERPL-SCNC: 3.6 MMOL/L (ref 3.5–5.1)
PROT SERPL-MCNC: 6.4 G/DL (ref 6.4–8.2)
PROT SERPL-MCNC: 6.6 G/DL (ref 6.4–8.2)
PROTHROMBIN TIME: 12.3 SECONDS (ref 9–12)
RBC # BLD AUTO: 3.67 X10'6 (ref 4.2–5.6)
SODIUM SERPL-SCNC: 140 MMOL/L (ref 135–145)
SODIUM SERPL-SCNC: 144 MMOL/L (ref 135–145)
WBC # BLD AUTO: 14.9 X10'3 (ref 4.5–11)

## 2019-03-14 RX ADMIN — DOCUSATE SODIUM SCH MG: 100 CAPSULE, LIQUID FILLED ORAL at 20:00

## 2019-03-14 RX ADMIN — HYDROMORPHONE HYDROCHLORIDE PRN MG: 1 INJECTION, SOLUTION INTRAMUSCULAR; INTRAVENOUS; SUBCUTANEOUS at 01:11

## 2019-03-14 RX ADMIN — INSULIN LISPRO SCH UNITS: 100 INJECTION, SOLUTION INTRAVENOUS; SUBCUTANEOUS at 21:34

## 2019-03-14 RX ADMIN — ONDANSETRON PRN MG: 2 INJECTION, SOLUTION INTRAMUSCULAR; INTRAVENOUS at 19:02

## 2019-03-14 RX ADMIN — TAZOBACTAM SODIUM AND PIPERACILLIN SODIUM SCH MLS/HR: 375; 3 INJECTION, SOLUTION INTRAVENOUS at 22:48

## 2019-03-14 RX ADMIN — TAZOBACTAM SODIUM AND PIPERACILLIN SODIUM SCH MLS/HR: 375; 3 INJECTION, SOLUTION INTRAVENOUS at 05:32

## 2019-03-14 RX ADMIN — Medication SCH MMU: at 20:29

## 2019-03-14 RX ADMIN — HYDROMORPHONE HYDROCHLORIDE PRN MG: 1 INJECTION, SOLUTION INTRAMUSCULAR; INTRAVENOUS; SUBCUTANEOUS at 07:32

## 2019-03-14 RX ADMIN — IPRATROPIUM BROMIDE AND ALBUTEROL SULFATE SCH ML: .5; 3 SOLUTION RESPIRATORY (INHALATION) at 15:43

## 2019-03-14 RX ADMIN — Medication PRN MG: at 02:15

## 2019-03-14 RX ADMIN — Medication PRN MG: at 07:32

## 2019-03-14 RX ADMIN — Medication SCH MG: at 07:33

## 2019-03-14 RX ADMIN — FONDAPARINUX SODIUM SCH MG: 7.5 INJECTION, SOLUTION SUBCUTANEOUS at 07:32

## 2019-03-14 RX ADMIN — VANCOMYCIN HYDROCHLORIDE SCH MLS/HR: 750 INJECTION, POWDER, LYOPHILIZED, FOR SOLUTION INTRAVENOUS at 20:50

## 2019-03-14 RX ADMIN — PANTOPRAZOLE SODIUM SCH MG: 40 TABLET, DELAYED RELEASE ORAL at 07:33

## 2019-03-14 RX ADMIN — POTASSIUM CHLORIDE PRN MEQ: 1500 TABLET, EXTENDED RELEASE ORAL at 13:25

## 2019-03-14 RX ADMIN — INSULIN GLARGINE SCH UNIT: 100 INJECTION, SOLUTION SUBCUTANEOUS at 21:35

## 2019-03-14 RX ADMIN — HYDROMORPHONE HYDROCHLORIDE PRN MG: 1 INJECTION, SOLUTION INTRAMUSCULAR; INTRAVENOUS; SUBCUTANEOUS at 16:36

## 2019-03-14 RX ADMIN — POTASSIUM CHLORIDE PRN MEQ: 1500 TABLET, EXTENDED RELEASE ORAL at 07:33

## 2019-03-14 RX ADMIN — TAZOBACTAM SODIUM AND PIPERACILLIN SODIUM SCH MLS/HR: 375; 3 INJECTION, SOLUTION INTRAVENOUS at 15:52

## 2019-03-14 RX ADMIN — HYDROMORPHONE HYDROCHLORIDE PRN MG: 1 INJECTION, SOLUTION INTRAMUSCULAR; INTRAVENOUS; SUBCUTANEOUS at 12:00

## 2019-03-14 RX ADMIN — HYDROMORPHONE HYDROCHLORIDE PRN MG: 1 INJECTION, SOLUTION INTRAMUSCULAR; INTRAVENOUS; SUBCUTANEOUS at 20:51

## 2019-03-14 RX ADMIN — INSULIN LISPRO SCH UNITS: 100 INJECTION, SOLUTION INTRAVENOUS; SUBCUTANEOUS at 13:27

## 2019-03-14 RX ADMIN — LOPERAMIDE HYDROCHLORIDE PRN MG: 2 CAPSULE ORAL at 19:03

## 2019-03-14 RX ADMIN — INSULIN LISPRO SCH UNITS: 100 INJECTION, SOLUTION INTRAVENOUS; SUBCUTANEOUS at 08:51

## 2019-03-14 RX ADMIN — IPRATROPIUM BROMIDE AND ALBUTEROL SULFATE SCH ML: .5; 3 SOLUTION RESPIRATORY (INHALATION) at 23:00

## 2019-03-14 RX ADMIN — POTASSIUM CHLORIDE PRN MEQ: 1500 TABLET, EXTENDED RELEASE ORAL at 02:15

## 2019-03-14 RX ADMIN — DOCUSATE SODIUM SCH MG: 100 CAPSULE, LIQUID FILLED ORAL at 07:41

## 2019-03-14 RX ADMIN — IPRATROPIUM BROMIDE AND ALBUTEROL SULFATE SCH ML: .5; 3 SOLUTION RESPIRATORY (INHALATION) at 11:11

## 2019-03-14 RX ADMIN — IPRATROPIUM BROMIDE AND ALBUTEROL SULFATE SCH ML: .5; 3 SOLUTION RESPIRATORY (INHALATION) at 19:00

## 2019-03-14 RX ADMIN — IPRATROPIUM BROMIDE AND ALBUTEROL SULFATE SCH ML: .5; 3 SOLUTION RESPIRATORY (INHALATION) at 07:34

## 2019-03-14 RX ADMIN — LOPERAMIDE HYDROCHLORIDE PRN MG: 2 CAPSULE ORAL at 07:34

## 2019-03-14 RX ADMIN — IPRATROPIUM BROMIDE AND ALBUTEROL SULFATE SCH ML: .5; 3 SOLUTION RESPIRATORY (INHALATION) at 03:00

## 2019-03-14 RX ADMIN — VANCOMYCIN HYDROCHLORIDE SCH MLS/HR: 750 INJECTION, POWDER, LYOPHILIZED, FOR SOLUTION INTRAVENOUS at 08:44

## 2019-03-14 NOTE — NUR
Gave report to surgical RN Mary. Pt transferred to surgical with tele via wheelchair. All 
belongings sent with pt.

## 2019-03-14 NOTE — NUR
pt refused humalog due to BS being 70, pt was concerned that her blood sugar would bottom 
out if she received insulin, informed pt we will check BS at 2100

## 2019-03-14 NOTE — NUR
Patient in room CICU 2014. I have received report from MORELIA Grey and had the opportunity to 
ask questions and assume patient care.

## 2019-03-14 NOTE — NUR
pt has been picking at scabs on her head causing her to bleed, pt refused shower until the 
morning time when her mother was her to help her and so her mother could bring her shampoo, 
informed pt we have plenty of shampoo and aides to help she wanted to wait till morning, 
wrapped pts head in kerlex

## 2019-03-15 VITALS — SYSTOLIC BLOOD PRESSURE: 91 MMHG | DIASTOLIC BLOOD PRESSURE: 64 MMHG

## 2019-03-15 VITALS — DIASTOLIC BLOOD PRESSURE: 56 MMHG | SYSTOLIC BLOOD PRESSURE: 90 MMHG

## 2019-03-15 VITALS — DIASTOLIC BLOOD PRESSURE: 65 MMHG | SYSTOLIC BLOOD PRESSURE: 102 MMHG

## 2019-03-15 VITALS — SYSTOLIC BLOOD PRESSURE: 120 MMHG | DIASTOLIC BLOOD PRESSURE: 84 MMHG

## 2019-03-15 VITALS — DIASTOLIC BLOOD PRESSURE: 64 MMHG | SYSTOLIC BLOOD PRESSURE: 105 MMHG

## 2019-03-15 LAB
ALBUMIN SERPL BCP-MCNC: 2.2 G/DL (ref 3.4–5)
ALBUMIN/GLOB SERPL: 0.5 {RATIO} (ref 1.1–1.5)
ALP SERPL-CCNC: 219 IU/L (ref 46–116)
ALT SERPL W P-5'-P-CCNC: 11 U/L (ref 12–78)
ANION GAP SERPL CALCULATED.3IONS-SCNC: 4 MMOL/L (ref 8–16)
APTT PPP: 31 SECONDS (ref 22–32)
AST SERPL W P-5'-P-CCNC: 10 U/L (ref 10–37)
BASOPHILS # BLD AUTO: 0.1 X10'3 (ref 0–0.2)
BASOPHILS NFR BLD AUTO: 1.2 % (ref 0–1)
BILIRUB SERPL-MCNC: 0.3 MG/DL (ref 0.1–1)
BUN SERPL-MCNC: 18 MG/DL (ref 7–18)
BUN/CREAT SERPL: 11.8 (ref 6.6–38)
CALCIUM SERPL-MCNC: 7.9 MG/DL (ref 8.5–10.1)
CHLORIDE SERPL-SCNC: 100 MMOL/L (ref 99–107)
CO2 SERPL-SCNC: 34.8 MMOL/L (ref 24–32)
CREAT SERPL-MCNC: 1.53 MG/DL (ref 0.4–0.9)
EOSINOPHIL # BLD AUTO: 1.2 X10'3 (ref 0–0.9)
EOSINOPHIL NFR BLD AUTO: 10.2 % (ref 0–6)
ERYTHROCYTE [DISTWIDTH] IN BLOOD BY AUTOMATED COUNT: 15.6 % (ref 11.5–14.5)
GFR SERPL CREATININE-BSD FRML MDRD: 41 ML/MIN
GLUCOSE SERPL-MCNC: 233 MG/DL (ref 70–104)
HCT VFR BLD AUTO: 41.1 % (ref 35–45)
HGB BLD-MCNC: 13 G/DL (ref 12–16)
INR PPP: 1.2 INR
LYMPHOCYTES # BLD AUTO: 3.8 X10'3 (ref 1.1–4.8)
LYMPHOCYTES NFR BLD AUTO: 33.4 % (ref 21–51)
MAGNESIUM SERPL-MCNC: 1.3 MG/DL (ref 1.5–2.4)
MCH RBC QN AUTO: 29.7 PG (ref 27–31)
MCHC RBC AUTO-ENTMCNC: 31.6 G/DL (ref 33–36.5)
MCV RBC AUTO: 94.2 FL (ref 78–98)
MONOCYTES # BLD AUTO: 1.2 X10'3 (ref 0–0.9)
MONOCYTES NFR BLD AUTO: 10.8 % (ref 2–12)
NEUTROPHILS # BLD AUTO: 5 X10'3 (ref 1.8–7.7)
NEUTROPHILS NFR BLD AUTO: 44.4 % (ref 42–75)
PHOSPHATE SERPL-MCNC: 2.8 MG/DL (ref 2.3–4.5)
PLATELET # BLD AUTO: 393 X10'3 (ref 140–440)
PMV BLD AUTO: 8.5 FL (ref 7.4–10.4)
POTASSIUM SERPL-SCNC: 4.4 MMOL/L (ref 3.5–5.1)
PROT SERPL-MCNC: 6.5 G/DL (ref 6.4–8.2)
PROTHROMBIN TIME: 11.7 SECONDS (ref 9–12)
RBC # BLD AUTO: 4.36 X10'6 (ref 4.2–5.6)
SODIUM SERPL-SCNC: 139 MMOL/L (ref 135–145)
WBC # BLD AUTO: 11.3 X10'3 (ref 4.5–11)

## 2019-03-15 RX ADMIN — Medication SCH MMU: at 07:24

## 2019-03-15 RX ADMIN — IPRATROPIUM BROMIDE AND ALBUTEROL SULFATE SCH ML: .5; 3 SOLUTION RESPIRATORY (INHALATION) at 03:00

## 2019-03-15 RX ADMIN — DOCUSATE SODIUM SCH MG: 100 CAPSULE, LIQUID FILLED ORAL at 20:00

## 2019-03-15 RX ADMIN — VANCOMYCIN HYDROCHLORIDE SCH MLS/HR: 750 INJECTION, POWDER, LYOPHILIZED, FOR SOLUTION INTRAVENOUS at 21:03

## 2019-03-15 RX ADMIN — IPRATROPIUM BROMIDE AND ALBUTEROL SULFATE SCH ML: .5; 3 SOLUTION RESPIRATORY (INHALATION) at 14:23

## 2019-03-15 RX ADMIN — HYDROMORPHONE HYDROCHLORIDE PRN MG: 1 INJECTION, SOLUTION INTRAMUSCULAR; INTRAVENOUS; SUBCUTANEOUS at 18:31

## 2019-03-15 RX ADMIN — SODIUM CHLORIDE SCH MLS/HR: 9 INJECTION INTRAMUSCULAR; INTRAVENOUS; SUBCUTANEOUS at 22:59

## 2019-03-15 RX ADMIN — DOCUSATE SODIUM SCH MG: 100 CAPSULE, LIQUID FILLED ORAL at 07:24

## 2019-03-15 RX ADMIN — TAZOBACTAM SODIUM AND PIPERACILLIN SODIUM SCH MLS/HR: 375; 3 INJECTION, SOLUTION INTRAVENOUS at 05:34

## 2019-03-15 RX ADMIN — FONDAPARINUX SODIUM SCH MG: 7.5 INJECTION, SOLUTION SUBCUTANEOUS at 11:01

## 2019-03-15 RX ADMIN — IPRATROPIUM BROMIDE AND ALBUTEROL SULFATE SCH ML: .5; 3 SOLUTION RESPIRATORY (INHALATION) at 23:00

## 2019-03-15 RX ADMIN — HYDROMORPHONE HYDROCHLORIDE PRN MG: 1 INJECTION, SOLUTION INTRAMUSCULAR; INTRAVENOUS; SUBCUTANEOUS at 14:25

## 2019-03-15 RX ADMIN — TAZOBACTAM SODIUM AND PIPERACILLIN SODIUM SCH MLS/HR: 375; 3 INJECTION, SOLUTION INTRAVENOUS at 22:59

## 2019-03-15 RX ADMIN — TAZOBACTAM SODIUM AND PIPERACILLIN SODIUM SCH MLS/HR: 375; 3 INJECTION, SOLUTION INTRAVENOUS at 14:00

## 2019-03-15 RX ADMIN — PANTOPRAZOLE SODIUM SCH MG: 40 TABLET, DELAYED RELEASE ORAL at 07:24

## 2019-03-15 RX ADMIN — Medication SCH MG: at 07:25

## 2019-03-15 RX ADMIN — INSULIN GLARGINE SCH UNIT: 100 INJECTION, SOLUTION SUBCUTANEOUS at 21:06

## 2019-03-15 RX ADMIN — HYDROMORPHONE HYDROCHLORIDE PRN MG: 1 INJECTION, SOLUTION INTRAMUSCULAR; INTRAVENOUS; SUBCUTANEOUS at 05:35

## 2019-03-15 RX ADMIN — INSULIN LISPRO SCH UNITS: 100 INJECTION, SOLUTION INTRAVENOUS; SUBCUTANEOUS at 10:11

## 2019-03-15 RX ADMIN — IPRATROPIUM BROMIDE AND ALBUTEROL SULFATE SCH ML: .5; 3 SOLUTION RESPIRATORY (INHALATION) at 19:00

## 2019-03-15 RX ADMIN — HYDROMORPHONE HYDROCHLORIDE PRN MG: 1 INJECTION, SOLUTION INTRAMUSCULAR; INTRAVENOUS; SUBCUTANEOUS at 10:06

## 2019-03-15 RX ADMIN — INSULIN LISPRO SCH UNITS: 100 INJECTION, SOLUTION INTRAVENOUS; SUBCUTANEOUS at 14:09

## 2019-03-15 RX ADMIN — IPRATROPIUM BROMIDE AND ALBUTEROL SULFATE SCH ML: .5; 3 SOLUTION RESPIRATORY (INHALATION) at 11:00

## 2019-03-15 RX ADMIN — Medication SCH MMU: at 21:03

## 2019-03-15 RX ADMIN — VANCOMYCIN HYDROCHLORIDE SCH MLS/HR: 750 INJECTION, POWDER, LYOPHILIZED, FOR SOLUTION INTRAVENOUS at 11:00

## 2019-03-15 RX ADMIN — IPRATROPIUM BROMIDE AND ALBUTEROL SULFATE SCH ML: .5; 3 SOLUTION RESPIRATORY (INHALATION) at 07:00

## 2019-03-15 RX ADMIN — HYDROMORPHONE HYDROCHLORIDE PRN MG: 1 INJECTION, SOLUTION INTRAMUSCULAR; INTRAVENOUS; SUBCUTANEOUS at 00:55

## 2019-03-15 NOTE — NUR
Gave report to Avani THIBODEAUX pt is resting on RA, in no apparent distress, breaths even and 
unlabored, zosyn running@12.5mL/hr

## 2019-03-15 NOTE — NUR
Received report from Avani THIBODEAUX pt is awake requesting pain meds, finishing dinner, call 
light and items of freq use within reach.

## 2019-03-15 NOTE — NUR
pts blood sugar before dinner was 76, according to our protocol pt would be getting 5units 
of insulin to cover what pt ate for dinner, pt refused insulin for fear of her blood sugar 
falling to low I told her we will recheck blood sugar at 2100

## 2019-03-15 NOTE — NUR
Reassessment: Acute resp failure d/t ARDS resolved and pt off BiPAP per MD 

progress notes. Documented PO intake previously 25% however up to 100% at 

breakfast this AM meeting nutrient needs. LBM 3/14. Will continue to 

follow.

Recommend:

1. Continue CHO controlled diet

2. Continue bowel care

3. Weight per rx

4. Education: provide written and verbal DM education when pt is able

-------------------------------------------------------------------------------

Addendum: 03/15/19 at 1123 by Sobeida Berman RD

-------------------------------------------------------------------------------

Amended: Links added.

## 2019-03-15 NOTE — NUR
PT. AWAKE AND ALERT, IN A STABLE CONDITION, FLUIDS RUNNING AT 20/HR PER ORDER, JUST FINISHED 
HER DINNER AND RELAXED IN BED WATCHING TV. GAVE REPORT TO TUAN THIBODEAUX.

## 2019-03-15 NOTE — NUR
pt. stated she felt like her BG was dropping,pt. is fairly aware of the s/sx of low and high 
bg.Rechecked her BG after lunch. 201. Only will cover connectional dosing not nutritional.

## 2019-03-16 VITALS — DIASTOLIC BLOOD PRESSURE: 64 MMHG | SYSTOLIC BLOOD PRESSURE: 99 MMHG

## 2019-03-16 VITALS — SYSTOLIC BLOOD PRESSURE: 103 MMHG | DIASTOLIC BLOOD PRESSURE: 55 MMHG

## 2019-03-16 VITALS — DIASTOLIC BLOOD PRESSURE: 68 MMHG | SYSTOLIC BLOOD PRESSURE: 117 MMHG

## 2019-03-16 VITALS — DIASTOLIC BLOOD PRESSURE: 79 MMHG | SYSTOLIC BLOOD PRESSURE: 106 MMHG

## 2019-03-16 LAB
ALBUMIN SERPL BCP-MCNC: 2.1 G/DL (ref 3.4–5)
ALBUMIN/GLOB SERPL: 0.5 {RATIO} (ref 1.1–1.5)
ALP SERPL-CCNC: 185 IU/L (ref 46–116)
ALT SERPL W P-5'-P-CCNC: 10 U/L (ref 12–78)
ANION GAP SERPL CALCULATED.3IONS-SCNC: 4 MMOL/L (ref 8–16)
APTT PPP: 32 SECONDS (ref 22–32)
AST SERPL W P-5'-P-CCNC: 10 U/L (ref 10–37)
BASOPHILS # BLD AUTO: 0.1 X10'3 (ref 0–0.2)
BASOPHILS NFR BLD AUTO: 1.1 % (ref 0–1)
BILIRUB SERPL-MCNC: 0.3 MG/DL (ref 0.1–1)
BUN SERPL-MCNC: 19 MG/DL (ref 7–18)
BUN/CREAT SERPL: 13 (ref 6.6–38)
CALCIUM SERPL-MCNC: 8.2 MG/DL (ref 8.5–10.1)
CHLORIDE SERPL-SCNC: 101 MMOL/L (ref 99–107)
CO2 SERPL-SCNC: 32.9 MMOL/L (ref 24–32)
CREAT SERPL-MCNC: 1.46 MG/DL (ref 0.4–0.9)
EOSINOPHIL # BLD AUTO: 0.9 X10'3 (ref 0–0.9)
EOSINOPHIL NFR BLD AUTO: 8.4 % (ref 0–6)
ERYTHROCYTE [DISTWIDTH] IN BLOOD BY AUTOMATED COUNT: 15.3 % (ref 11.5–14.5)
GFR SERPL CREATININE-BSD FRML MDRD: 43 ML/MIN
GLUCOSE SERPL-MCNC: 270 MG/DL (ref 70–104)
HCT VFR BLD AUTO: 38.4 % (ref 35–45)
HGB BLD-MCNC: 12.5 G/DL (ref 12–16)
INR PPP: 1.2 INR
LYMPHOCYTES # BLD AUTO: 2.9 X10'3 (ref 1.1–4.8)
LYMPHOCYTES NFR BLD AUTO: 28.6 % (ref 21–51)
MAGNESIUM SERPL-MCNC: 1.5 MG/DL (ref 1.5–2.4)
MCH RBC QN AUTO: 30.1 PG (ref 27–31)
MCHC RBC AUTO-ENTMCNC: 32.6 G/DL (ref 33–36.5)
MCV RBC AUTO: 92.3 FL (ref 78–98)
MONOCYTES # BLD AUTO: 1.1 X10'3 (ref 0–0.9)
MONOCYTES NFR BLD AUTO: 10.7 % (ref 2–12)
NEUTROPHILS # BLD AUTO: 5.2 X10'3 (ref 1.8–7.7)
NEUTROPHILS NFR BLD AUTO: 51.2 % (ref 42–75)
PHOSPHATE SERPL-MCNC: 3.3 MG/DL (ref 2.3–4.5)
PLATELET # BLD AUTO: 408 X10'3 (ref 140–440)
PMV BLD AUTO: 8.5 FL (ref 7.4–10.4)
POTASSIUM SERPL-SCNC: 4.5 MMOL/L (ref 3.5–5.1)
PROT SERPL-MCNC: 6.1 G/DL (ref 6.4–8.2)
PROTHROMBIN TIME: 11.6 SECONDS (ref 9–12)
RBC # BLD AUTO: 4.16 X10'6 (ref 4.2–5.6)
SODIUM SERPL-SCNC: 138 MMOL/L (ref 135–145)
WBC # BLD AUTO: 10.1 X10'3 (ref 4.5–11)

## 2019-03-16 RX ADMIN — ONDANSETRON PRN MG: 2 INJECTION, SOLUTION INTRAMUSCULAR; INTRAVENOUS at 15:30

## 2019-03-16 RX ADMIN — DOCUSATE SODIUM SCH MG: 100 CAPSULE, LIQUID FILLED ORAL at 20:00

## 2019-03-16 RX ADMIN — IPRATROPIUM BROMIDE AND ALBUTEROL SULFATE SCH ML: .5; 3 SOLUTION RESPIRATORY (INHALATION) at 03:00

## 2019-03-16 RX ADMIN — HYDROMORPHONE HYDROCHLORIDE PRN MG: 1 INJECTION, SOLUTION INTRAMUSCULAR; INTRAVENOUS; SUBCUTANEOUS at 09:37

## 2019-03-16 RX ADMIN — INSULIN LISPRO SCH UNITS: 100 INJECTION, SOLUTION INTRAVENOUS; SUBCUTANEOUS at 08:51

## 2019-03-16 RX ADMIN — DOCUSATE SODIUM SCH MG: 100 CAPSULE, LIQUID FILLED ORAL at 08:00

## 2019-03-16 RX ADMIN — IPRATROPIUM BROMIDE AND ALBUTEROL SULFATE SCH ML: .5; 3 SOLUTION RESPIRATORY (INHALATION) at 07:00

## 2019-03-16 RX ADMIN — VANCOMYCIN HYDROCHLORIDE SCH MLS/HR: 750 INJECTION, POWDER, LYOPHILIZED, FOR SOLUTION INTRAVENOUS at 09:36

## 2019-03-16 RX ADMIN — HYDROMORPHONE HYDROCHLORIDE PRN MG: 1 INJECTION, SOLUTION INTRAMUSCULAR; INTRAVENOUS; SUBCUTANEOUS at 05:13

## 2019-03-16 RX ADMIN — TAZOBACTAM SODIUM AND PIPERACILLIN SODIUM SCH MLS/HR: 375; 3 INJECTION, SOLUTION INTRAVENOUS at 05:42

## 2019-03-16 RX ADMIN — HYDROMORPHONE HYDROCHLORIDE PRN MG: 1 INJECTION, SOLUTION INTRAMUSCULAR; INTRAVENOUS; SUBCUTANEOUS at 01:12

## 2019-03-16 RX ADMIN — HYDROMORPHONE HYDROCHLORIDE PRN MG: 1 INJECTION, SOLUTION INTRAMUSCULAR; INTRAVENOUS; SUBCUTANEOUS at 13:38

## 2019-03-16 RX ADMIN — FONDAPARINUX SODIUM SCH MG: 7.5 INJECTION, SOLUTION SUBCUTANEOUS at 09:00

## 2019-03-16 RX ADMIN — Medication SCH MMU: at 08:56

## 2019-03-16 RX ADMIN — PANTOPRAZOLE SODIUM SCH MG: 40 TABLET, DELAYED RELEASE ORAL at 08:54

## 2019-03-16 RX ADMIN — LOPERAMIDE HYDROCHLORIDE PRN MG: 2 CAPSULE ORAL at 16:18

## 2019-03-16 RX ADMIN — Medication SCH MMU: at 20:20

## 2019-03-16 RX ADMIN — TAZOBACTAM SODIUM AND PIPERACILLIN SODIUM SCH MLS/HR: 375; 3 INJECTION, SOLUTION INTRAVENOUS at 14:18

## 2019-03-16 RX ADMIN — IPRATROPIUM BROMIDE AND ALBUTEROL SULFATE SCH ML: .5; 3 SOLUTION RESPIRATORY (INHALATION) at 11:00

## 2019-03-16 RX ADMIN — INSULIN GLARGINE SCH UNIT: 100 INJECTION, SOLUTION SUBCUTANEOUS at 21:54

## 2019-03-16 RX ADMIN — INSULIN LISPRO SCH UNITS: 100 INJECTION, SOLUTION INTRAVENOUS; SUBCUTANEOUS at 20:17

## 2019-03-16 RX ADMIN — Medication SCH MG: at 08:56

## 2019-03-16 RX ADMIN — IPRATROPIUM BROMIDE AND ALBUTEROL SULFATE SCH ML: .5; 3 SOLUTION RESPIRATORY (INHALATION) at 19:00

## 2019-03-16 RX ADMIN — IPRATROPIUM BROMIDE AND ALBUTEROL SULFATE SCH ML: .5; 3 SOLUTION RESPIRATORY (INHALATION) at 23:00

## 2019-03-16 RX ADMIN — LEVOFLOXACIN SCH MLS/HR: 750 INJECTION, SOLUTION INTRAVENOUS at 15:54

## 2019-03-16 NOTE — NUR
Patient blood sugar was 486. Dr. Roland called and ordered 8 units of humalog to be given SQ 
now and to recheck blood sugar in an hour.

## 2019-03-16 NOTE — NUR
Received report from rPakash RN pts current BS is 492 prakash spoke with  to give pt 
10 units of insulin and recheck bs in an hour, pt is agitated about dilaudid being D/C pt 
stated she wanted her Ultram

## 2019-03-16 NOTE — NUR
O2 Sat at rest on room air:85%

     If below 89%:

Recovery O2 Sat at rest on 1.0 LPM via nasal cannula: 95%

No further documentation is necessary.

## 2019-03-16 NOTE — NUR
Recheck of blood sugar after 8 units of humalog was 492. Dr. Roland notified. Dr. Roland 
ordered an additional 10 units of humalog to be given SQ and to recheck the sugar again in 
one hour. MD would like NOC nurse to inform NOC MD about the blood sugar recheck. MORELIA Levy notified.

## 2019-03-16 NOTE — NUR
Patient stated that she was feeling like she had low blood sugar. 1153 blood sugar checked 
and it was 55. 15 G of oral glucose given per protocol. Recheck at 1215 blood sugar was 34. 
Patient was diaphoretic and stated "I feel horrible" IV dextrose 25 mL was given and a 
recheck of blood sugar at 1235 was 119. Will continue to monitor patient.

## 2019-03-16 NOTE — NUR
Problems reprioritized. Patient report given, questions answered & plan of care reviewed 
with MORELIA Levy. Patient very agitated at this time. Ativan already given. Call light and 
items of frequent use in reach of patient.

## 2019-03-16 NOTE — NUR
Patient threatening to leave AMA due to not getting IV dilaudid. Patient tore ID band off 
and threw it on the floor. Patient yelling and cussing because she refuses to stay without 
pain medication. patient has tramadol available to her but refuses it stating, "that s*** 
doesn't work for me" Dr. Roland made aware.

## 2019-03-16 NOTE — NUR
Informed  of low blood sugar of 44 informed him i followed protocol and treated 
pt current blood sugar is 131

## 2019-03-16 NOTE — NUR
Gave report to Miim THIBODEAUX pt is resting on RA in no apparent distress, call light and items 
of freq use within reach.

## 2019-03-16 NOTE — NUR
2230 pt stated she felt her blood sugar was low, checked blood sugar 44 gave two glucose 
shots, rechecked bs 2250 bs was 67, pt refused another glucose drink, stated she would drink 
a juice and eat sharonda crackers will recheck blood sugar at 2315

## 2019-03-16 NOTE — NUR
pt refused to let me perform physical assessment, I was able to assess pts skin as it was 
exposed because pt was uncovered, I was not able to listen to heart, lungs, or stomach

## 2019-03-16 NOTE — NUR
Patient in room [facility] 352. I have received report from MORELIA Levy and had the opportunity to 
ask questions and assume patient care. Patient resting comfortably at this time. In no 
apparent distress on room air. Call light and items of frequent use in reach of patient.

## 2019-03-17 VITALS — DIASTOLIC BLOOD PRESSURE: 52 MMHG | SYSTOLIC BLOOD PRESSURE: 94 MMHG

## 2019-03-17 VITALS — DIASTOLIC BLOOD PRESSURE: 74 MMHG | SYSTOLIC BLOOD PRESSURE: 102 MMHG

## 2019-03-17 VITALS — DIASTOLIC BLOOD PRESSURE: 81 MMHG | SYSTOLIC BLOOD PRESSURE: 116 MMHG

## 2019-03-17 LAB
ALBUMIN SERPL BCP-MCNC: 2.4 G/DL (ref 3.4–5)
ALBUMIN/GLOB SERPL: 0.5 {RATIO} (ref 1.1–1.5)
ALP SERPL-CCNC: 192 IU/L (ref 46–116)
ALT SERPL W P-5'-P-CCNC: 10 U/L (ref 12–78)
ANION GAP SERPL CALCULATED.3IONS-SCNC: 6 MMOL/L (ref 8–16)
AST SERPL W P-5'-P-CCNC: 8 U/L (ref 10–37)
BILIRUB SERPL-MCNC: 0.2 MG/DL (ref 0.1–1)
BUN SERPL-MCNC: 22 MG/DL (ref 7–18)
BUN/CREAT SERPL: 18 (ref 6.6–38)
CALCIUM SERPL-MCNC: 9 MG/DL (ref 8.5–10.1)
CHLORIDE SERPL-SCNC: 99 MMOL/L (ref 99–107)
CO2 SERPL-SCNC: 28.3 MMOL/L (ref 24–32)
CREAT SERPL-MCNC: 1.22 MG/DL (ref 0.4–0.9)
GFR SERPL CREATININE-BSD FRML MDRD: 53 ML/MIN
GLUCOSE SERPL-MCNC: 334 MG/DL (ref 70–104)
POTASSIUM SERPL-SCNC: 5.6 MMOL/L (ref 3.5–5.1)
PROT SERPL-MCNC: 6.8 G/DL (ref 6.4–8.2)
SODIUM SERPL-SCNC: 133 MMOL/L (ref 135–145)

## 2019-03-17 RX ADMIN — LOPERAMIDE HYDROCHLORIDE PRN MG: 2 CAPSULE ORAL at 09:01

## 2019-03-17 RX ADMIN — PANTOPRAZOLE SODIUM SCH MG: 40 TABLET, DELAYED RELEASE ORAL at 08:55

## 2019-03-17 RX ADMIN — Medication SCH MMU: at 08:57

## 2019-03-17 RX ADMIN — LEVOFLOXACIN SCH MLS/HR: 750 INJECTION, SOLUTION INTRAVENOUS at 08:55

## 2019-03-17 RX ADMIN — FONDAPARINUX SODIUM SCH MG: 7.5 INJECTION, SOLUTION SUBCUTANEOUS at 09:02

## 2019-03-17 RX ADMIN — Medication SCH MG: at 09:00

## 2019-03-17 RX ADMIN — INSULIN LISPRO SCH UNITS: 100 INJECTION, SOLUTION INTRAVENOUS; SUBCUTANEOUS at 08:54

## 2019-03-17 RX ADMIN — DOCUSATE SODIUM SCH MG: 100 CAPSULE, LIQUID FILLED ORAL at 08:00

## 2019-03-17 NOTE — NUR
Dr. Roland in to see patient and said that he would like her blood sugar checked in an 
another hour (10:45) and if the level wasn't too low or too high then he would discharge 
her. Patient stated that she is leaving regardless. Will assess blood sugar at appropriate 
time.

## 2019-03-17 NOTE — NUR
Patient in room [facility] 352. I have received report from MORELIA Levy and had the opportunity to 
ask questions and assume patient care. patient resting comfortably at this time. Call light 
and items of frequent use in reach of patient.

## 2019-03-17 NOTE — NUR
Gave report to Mimi THIBODEAUX pt is resting on RA breaths even and unlabored, call light within 
reach.

## 2019-03-17 NOTE — NUR
gave pt pain meds and erick her blood at first pt refused to let me draw blood stating no you 
are not drawing my blood I am being discharged today, then changed her mind. Pt stated she 
did not want to be woken up again until her breakfast comes.

## 2019-03-17 NOTE — NUR
Dr Roland called requesting that patients K+ be redrawn,. Dr Roland also aware patients 
blood sugar is 375 he would like us to give 8 units Humalog x1 and recheck patients blood 
sugar in 1 hour.

## 2019-03-17 NOTE — NUR
Patient still very agitated and crying loudly. Patient given Ativan to help with patient 
anxiety. Patient stated that she will be leaving at 1200 even if she is not discharged. When 
trying to go through patients medications with her at morning med pass, patient began 
screaming, "I don't care what you say or what you do!! Just give me my medications and leave 
me the hell alone". Patient was asked to stop yelling as there are other sick people in the 
hospital trying to get better. patient stated that she does not care. patient refused a 
physical assessment stating, "You don't know anything anyway!". Patient was educated on the 
fact that we need to be able to assess her heart and lungs so that we can have a better 
picture of what is going on in her body. Patient stated, " I want you out of my room and I 
don't want anyone to come in again". Will reassess patent's anxiety.

## 2019-03-17 NOTE — NUR
Patient left AMA. Extended PIV removed with cannula intact. Tele monitor removed. Patient 
last blood glucose level was 375 and Dr. Roland had ordered to give patient 8 units of 
Humalog which was not given because patient refused. Patient stated she is done with all of 
this and she wants to get out of here. Redraw of K was sent down to lab. Dr. Roland and Case 
manager notified of patient leaving AMA. Patient took all belongings with her.

## 2019-03-17 NOTE — NUR
Eleni, surgical  took patient home medications that were stored in pharmacy to 
patient in lobby. Eleni, got the okay from charge and house supervisor to go ahead and give 
patient her home meds.

## 2019-03-17 NOTE — NUR
Patient very agitated at this time. Patient stating, "I don't want anyone coming into my 
room until I eat". Patient was educated that we are here to treat her and to get her healthy 
enough to go home. Patient still agitated and yelling profanity.

## 2019-03-17 NOTE — NUR
Rechecked blood sugar and it was 375. Dr. Roland notified. Patient stating that she wants to 
leave and that we all need to hurry up. patient educated about discharge paperwork and that 
once given the orders for discharge it can take 45min-1hour.